# Patient Record
Sex: MALE | Race: WHITE | URBAN - METROPOLITAN AREA
[De-identification: names, ages, dates, MRNs, and addresses within clinical notes are randomized per-mention and may not be internally consistent; named-entity substitution may affect disease eponyms.]

---

## 2021-02-24 ENCOUNTER — IMMUNIZATION (OUTPATIENT)
Dept: PRIMARY CARE CLINIC | Age: 66
End: 2021-02-24
Payer: MEDICARE

## 2021-02-24 PROCEDURE — 91300 COVID-19, PFIZER VACCINE 30MCG/0.3ML DOSE: CPT | Performed by: FAMILY MEDICINE

## 2021-02-24 PROCEDURE — 0001A COVID-19, PFIZER VACCINE 30MCG/0.3ML DOSE: CPT | Performed by: FAMILY MEDICINE

## 2021-03-17 ENCOUNTER — TELEPHONE (OUTPATIENT)
Dept: PRIMARY CARE CLINIC | Age: 66
End: 2021-03-17

## 2021-03-17 NOTE — TELEPHONE ENCOUNTER
----- Message from Negrito Ureña sent at 3/15/2021 10:12 AM EDT -----  Regarding: Reschedule 2nd Dose Appt  Caller Name: Adeline Peralta    Relationship to Patient: Self    Preferred Call Back Number: [phone]    Initial Dose Location: [facility]    Additional Notes: Patient has been diagnosed with COVID and needs to reschedule 2nd dose appt

## 2021-03-17 NOTE — TELEPHONE ENCOUNTER
Attempted call to pt, he will need to wait 30 days from dx for second vaccine. Will place him on the wait list for appropriate date. Will call back to establish an estimated return date.

## 2021-04-14 ENCOUNTER — TELEPHONE (OUTPATIENT)
Dept: PRIMARY CARE CLINIC | Age: 66
End: 2021-04-14

## 2021-04-14 NOTE — TELEPHONE ENCOUNTER
Called pt he was dx with covid and couldn't make his second dose. It has been 33 days.  Pt has confirmed April 22, 2021 between 1-4pm

## 2021-04-14 NOTE — TELEPHONE ENCOUNTER
----- Message from Jeff Mancera sent at 3/15/2021 10:12 AM EDT -----  Regarding: Reschedule 2nd Dose Appt  Caller Name: Angelica Parker    Relationship to Patient: Self    Preferred Call Back Number: [phone]    Initial Dose Location: [facility]    Additional Notes: Patient has been diagnosed with COVID and needs to reschedule 2nd dose appt

## 2021-04-22 ENCOUNTER — IMMUNIZATION (OUTPATIENT)
Dept: PRIMARY CARE CLINIC | Age: 66
End: 2021-04-22
Payer: MEDICARE

## 2021-04-22 PROCEDURE — 0002A COVID-19, PFIZER VACCINE 30MCG/0.3ML DOSE: CPT | Performed by: FAMILY MEDICINE

## 2021-04-22 PROCEDURE — 91300 COVID-19, PFIZER VACCINE 30MCG/0.3ML DOSE: CPT | Performed by: FAMILY MEDICINE
